# Patient Record
Sex: MALE | Race: WHITE
[De-identification: names, ages, dates, MRNs, and addresses within clinical notes are randomized per-mention and may not be internally consistent; named-entity substitution may affect disease eponyms.]

---

## 2018-08-27 ENCOUNTER — HOSPITAL ENCOUNTER (OUTPATIENT)
Dept: HOSPITAL 92 - LABBT | Age: 58
Discharge: HOME | End: 2018-08-27
Attending: SURGERY
Payer: SELF-PAY

## 2018-08-27 DIAGNOSIS — K40.90: ICD-10-CM

## 2018-08-27 DIAGNOSIS — Z01.810: Primary | ICD-10-CM

## 2018-08-27 PROCEDURE — 93005 ELECTROCARDIOGRAM TRACING: CPT

## 2018-08-27 PROCEDURE — 93010 ELECTROCARDIOGRAM REPORT: CPT

## 2018-08-27 NOTE — EKG
Test Reason : 

Blood Pressure : ***/*** mmHG

Vent. Rate : 060 BPM     Atrial Rate : 060 BPM

   P-R Int : 144 ms          QRS Dur : 086 ms

    QT Int : 394 ms       P-R-T Axes : 050 051 063 degrees

   QTc Int : 394 ms

 

Normal sinus rhythm

Normal ECG

Confirmed by DIANA NASH ([id number]) on 8/27/2018 4:02:39 PM

 

Referred By:  CHASE           Confirmed By:DIANA NASH